# Patient Record
Sex: FEMALE | Race: BLACK OR AFRICAN AMERICAN | Employment: FULL TIME
[De-identification: names, ages, dates, MRNs, and addresses within clinical notes are randomized per-mention and may not be internally consistent; named-entity substitution may affect disease eponyms.]

---

## 2023-09-14 ENCOUNTER — APPOINTMENT (OUTPATIENT)
Facility: HOSPITAL | Age: 28
End: 2023-09-14
Payer: COMMERCIAL

## 2023-09-14 ENCOUNTER — HOSPITAL ENCOUNTER (EMERGENCY)
Facility: HOSPITAL | Age: 28
Discharge: HOME | End: 2023-09-14
Attending: STUDENT IN AN ORGANIZED HEALTH CARE EDUCATION/TRAINING PROGRAM
Payer: COMMERCIAL

## 2023-09-14 VITALS
HEIGHT: 68 IN | DIASTOLIC BLOOD PRESSURE: 81 MMHG | TEMPERATURE: 97.6 F | SYSTOLIC BLOOD PRESSURE: 137 MMHG | OXYGEN SATURATION: 100 % | WEIGHT: 245 LBS | HEART RATE: 100 BPM | RESPIRATION RATE: 17 BRPM | BODY MASS INDEX: 37.13 KG/M2

## 2023-09-14 DIAGNOSIS — S30.1XXA CONTUSION OF ABDOMINAL WALL, INITIAL ENCOUNTER: Primary | ICD-10-CM

## 2023-09-14 DIAGNOSIS — R51.9 NONINTRACTABLE HEADACHE, UNSPECIFIED CHRONICITY PATTERN, UNSPECIFIED HEADACHE TYPE: ICD-10-CM

## 2023-09-14 DIAGNOSIS — R19.7 DIARRHEA, UNSPECIFIED TYPE: ICD-10-CM

## 2023-09-14 DIAGNOSIS — M25.78 CERVICAL OSTEOPHYTE: ICD-10-CM

## 2023-09-14 LAB
ANION GAP SERPL CALC-SCNC: 6 MMOL/L (ref 3–18)
BASOPHILS # BLD: 0 K/UL (ref 0–0.1)
BASOPHILS NFR BLD: 1 % (ref 0–2)
BUN SERPL-MCNC: 17 MG/DL (ref 7–18)
BUN/CREAT SERPL: 20 (ref 12–20)
CALCIUM SERPL-MCNC: 9 MG/DL (ref 8.5–10.1)
CHLORIDE SERPL-SCNC: 108 MMOL/L (ref 100–111)
CO2 SERPL-SCNC: 24 MMOL/L (ref 21–32)
CREAT SERPL-MCNC: 0.84 MG/DL (ref 0.6–1.3)
DIFFERENTIAL METHOD BLD: ABNORMAL
EOSINOPHIL # BLD: 0.4 K/UL (ref 0–0.4)
EOSINOPHIL NFR BLD: 5 % (ref 0–5)
ERYTHROCYTE [DISTWIDTH] IN BLOOD BY AUTOMATED COUNT: 13.4 % (ref 11.6–14.5)
GLUCOSE SERPL-MCNC: 91 MG/DL (ref 74–99)
HCG SERPL QL: NEGATIVE
HCT VFR BLD AUTO: 36.4 % (ref 35–45)
HGB BLD-MCNC: 11.5 G/DL (ref 12–16)
IMM GRANULOCYTES # BLD AUTO: 0 K/UL (ref 0–0.04)
IMM GRANULOCYTES NFR BLD AUTO: 0 % (ref 0–0.5)
LYMPHOCYTES # BLD: 2.3 K/UL (ref 0.9–3.6)
LYMPHOCYTES NFR BLD: 30 % (ref 21–52)
MCH RBC QN AUTO: 26.9 PG (ref 24–34)
MCHC RBC AUTO-ENTMCNC: 31.6 G/DL (ref 31–37)
MCV RBC AUTO: 85 FL (ref 78–100)
MONOCYTES # BLD: 0.4 K/UL (ref 0.05–1.2)
MONOCYTES NFR BLD: 5 % (ref 3–10)
NEUTS SEG # BLD: 4.6 K/UL (ref 1.8–8)
NEUTS SEG NFR BLD: 60 % (ref 40–73)
NRBC # BLD: 0 K/UL (ref 0–0.01)
NRBC BLD-RTO: 0 PER 100 WBC
PLATELET # BLD AUTO: 261 K/UL (ref 135–420)
PMV BLD AUTO: 11.2 FL (ref 9.2–11.8)
POTASSIUM SERPL-SCNC: 3.4 MMOL/L (ref 3.5–5.5)
RBC # BLD AUTO: 4.28 M/UL (ref 4.2–5.3)
SODIUM SERPL-SCNC: 138 MMOL/L (ref 136–145)
WBC # BLD AUTO: 7.7 K/UL (ref 4.6–13.2)

## 2023-09-14 PROCEDURE — 99285 EMERGENCY DEPT VISIT HI MDM: CPT

## 2023-09-14 PROCEDURE — 74177 CT ABD & PELVIS W/CONTRAST: CPT

## 2023-09-14 PROCEDURE — 80048 BASIC METABOLIC PNL TOTAL CA: CPT

## 2023-09-14 PROCEDURE — 84703 CHORIONIC GONADOTROPIN ASSAY: CPT

## 2023-09-14 PROCEDURE — 72128 CT CHEST SPINE W/O DYE: CPT

## 2023-09-14 PROCEDURE — 70450 CT HEAD/BRAIN W/O DYE: CPT

## 2023-09-14 PROCEDURE — 85025 COMPLETE CBC W/AUTO DIFF WBC: CPT

## 2023-09-14 PROCEDURE — 6360000004 HC RX CONTRAST MEDICATION: Performed by: HEALTH CARE PROVIDER

## 2023-09-14 RX ADMIN — IOPAMIDOL 100 ML: 612 INJECTION, SOLUTION INTRAVENOUS at 22:37

## 2023-09-14 ASSESSMENT — ENCOUNTER SYMPTOMS
CHEST TIGHTNESS: 0
SHORTNESS OF BREATH: 0
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
## 2023-09-15 NOTE — ED NOTES
Patient was seen and treated by provider. Patient was discharged to home. IV is discontinued, if applicable. Patient was accompanied by family/ friend/ self. Discharge instructions were explained to patient. Questions were answered.         Harsh Reyes RN  09/14/23 0988
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
## 2024-06-18 ENCOUNTER — HOSPITAL ENCOUNTER (OUTPATIENT)
Facility: HOSPITAL | Age: 29
Setting detail: RECURRING SERIES
Discharge: HOME | End: 2024-06-21
Payer: COMMERCIAL

## 2024-06-18 PROCEDURE — 97530 THERAPEUTIC ACTIVITIES: CPT

## 2024-06-18 PROCEDURE — 97162 PT EVAL MOD COMPLEX 30 MIN: CPT

## 2024-06-18 NOTE — PROGRESS NOTES
home and community integration to address functional deficits and attain remaining goals.     [x]  See Plan of Care         PLAN  [x]  Upgrade activities as tolerated     [x]  Continue plan of care  []  Update interventions per flow sheet       []  Discharge due to:_  []  Other:_        Arnulfo Lara, PT 6/18/2024  8:16 AM

## 2024-06-18 NOTE — THERAPY EVALUATION
[facility]  [address] [place] [address]  [phone] fax    Plan of Care/ Statement of Necessity for Physical Therapy Services    Patient Name: Yuliet Nunn : 1995   Medical   Diagnosis: Rectocele [N81.6] Treatment Diagnosis: R39.89  Other signs and symptoms of genitourinary system, N39.42  INCONTINENCE WITHOUT SENSORY AWARENESS, and M62.89  OTHER SPECIFIED DISORDERS OF MUSCLE      Onset Date: 2023 Payor :  Payor: RAMÍREZ / Plan: [employer] RPN / Product Type: *No Product type* /    Referral Source: Kinsey Page PA Start of Care (SOC): 2024   Prior Hospitalization: See medical history Provider #: [id number]   Prior Level of Function: mod ind with ADLs, chronic pelvic pain/dyspareunia    Comorbidities: Chronic pelvic pain, multiple medical problems              The Plan of Care and following information is based on the information from the initial evaluation.  Assessment/ baxter information: Ms. Yuliet Nunn is a 30 y/o, F who present with c/o rectocele, PFD/dyspareunia, constipation, & mild UI. Problem started around Nov last year when pt had a lot of other medical problems at the same time.  Bowel frequency ranges from 1x/day to skipping 4-7 days in a row.  Pt reports significant straining, leaning her trunk backwards to help with BM but incomplete voiding most of the time.  Pt notes bleeding (about 1x/week) & aggravated hemorrhoid with BM. She's been increasing fiber to help with BM.  Pt reports very mild urinary leakage with some activities, less than 1x/week. She notes some trouble with starting urine stream & incomplete voiding sensation. Nocturia is 1x/night. She notes some blood with her vaginal discharge after last BM.  Pt reports chronic pelvic pain/discomfort due to scar tissue after Bartholin's cyst removal. Pain occurs with both initial penetration & deep penetration, rated 3/3 on Marinoff scale.  Pt experiences

## 2024-06-25 ENCOUNTER — HOSPITAL ENCOUNTER (OUTPATIENT)
Facility: HOSPITAL | Age: 29
Setting detail: RECURRING SERIES
Discharge: HOME | End: 2024-06-28
Payer: COMMERCIAL

## 2024-06-25 PROCEDURE — 97530 THERAPEUTIC ACTIVITIES: CPT

## 2024-06-25 PROCEDURE — 97112 NEUROMUSCULAR REEDUCATION: CPT

## 2024-06-25 NOTE — PROGRESS NOTES
proprioception to improve patient's ability to develop conscious control of individual muscles and awareness of position of extremities in order to progress to PLOF and address remaining functional goals. (see flow sheet as applicable)     Details if applicable:  PFM coordination, KNACK, relaxation techniques/tip     Rationale: Increase pelvic floor muscle strength, Improve quality of pelvic floor contractions, Decrease resting tone of the pelvic floor, Increase tissue extensibility of the pelvic floor muscles, Increase core strength, Inhibit abnormal muscle activity, and Improve lumbosacral and coccygeal mobility in order to Increase urinary continence, Decrease urinary urgency, Increase ability to delay urination, Decrease frequency of urination, Decrease nocturia, Improve frequency and ease of bowel movements, Improve ability to engage in sexual intercourse, Improve ability to undergo a gynecological exam, and Improve ability to perform ADLs.    Total  34 Total Reminder: MC/BC bill using total billable min of TIMED therapeutic procedures (example: do not include dry needle or estim unattended, both untimed codes, in totals to left)     [x]  Patient Education billed concurrently with other procedures     Other Objective/Functional Measures:   []baseline resting tone:   []slow twitch mms   []fast twitch mms    Pain Level (0-10 scale) post treatment: 3/10    ASSESSMENT/Changes in Function: pt present with mod-max tone of PFM, 4/10 pain with palpation; strength deemed 1+/5; fair endurance, max difficulty with complete relaxation of PFM after contraction. Pt demonstrates good understanding with pain management & pelvic wand use. Pt deemed highly benefit from TENS/NMES unit for pain relief. Will progress with down-training next visit. Will perform trial of NMES as tolerated.     []  Decrease # of leaks   [] No change []  Improving [] Resolved     []  Decrease hypertonus [] No change []  Improving [] Resolved     []

## 2024-07-02 ENCOUNTER — APPOINTMENT (OUTPATIENT)
Facility: HOSPITAL | Age: 29
End: 2024-07-02
Payer: COMMERCIAL

## 2024-07-09 ENCOUNTER — HOSPITAL ENCOUNTER (OUTPATIENT)
Facility: HOSPITAL | Age: 29
Setting detail: RECURRING SERIES
Discharge: HOME | End: 2024-07-12
Payer: COMMERCIAL

## 2024-07-09 PROCEDURE — 97530 THERAPEUTIC ACTIVITIES: CPT

## 2024-07-09 PROCEDURE — 97110 THERAPEUTIC EXERCISES: CPT

## 2024-07-16 ENCOUNTER — HOSPITAL ENCOUNTER (OUTPATIENT)
Facility: HOSPITAL | Age: 29
Setting detail: RECURRING SERIES
Discharge: HOME | End: 2024-07-19
Payer: COMMERCIAL

## 2024-07-16 PROCEDURE — 97032 APPL MODALITY 1+ESTIM EA 15: CPT

## 2024-07-16 PROCEDURE — 97530 THERAPEUTIC ACTIVITIES: CPT

## 2024-07-16 NOTE — PROGRESS NOTES
PF DAILY TREATMENT NOTE       Patient Name: Yuliet Nunn    Date: 2024    : 1995  Insurance: Payor: RAMÍREZ / Plan: RAM[employer]REZ RPN / Product Type: *No Product type* /      Patient  verified yes     Visit #   Current / Total 4 16-24   Time   In / Out 10:41 11:15   Pain   In / Out 0/10 0/10   Subjective Functional Status/Changes: Pt reports doing very well so far. Urinary leakage nearly resolves; she still notes some urgency & incomplete voiding problem . She's been using the tool to relieve her BM. Pt notes min pressure with sitting.      TREATMENT AREA =  Rectocele [N81.6]  Other specified disorders of muscle [M62.89]      OBJECTIVE    Modalities Rationale:     increase tissue extensibility and increase muscle contraction/control to improve patient's ability to progress to PLOF and address remaining functional goals.     min [] Estim Unattended, type/location:                                      []  w/ice    []  w/heat   10 min [x] Estim Attended, type/location: Internal NMES for PFM's tone/weakness; supine, wedge under knees                                    []  w/US     []  w/ice    []  w/heat    []  TENS insruct      min []  Mechanical Traction: type/lbs                   []  pro   []  sup   []  int   []  cont    []  before manual    []  after manual    min []  Ultrasound, settings/location:      min  unbill []  Ice     []  Heat    location/position:     min []  Paraffin,  details:     min []  Vasopneumatic Device, press/temp:     min []  Whirlpool / Fluido:    If using vaso (only need to measure limb vaso being performed on)      pre-treatment girth :       post-treatment girth :       measured at (landmark location) :      min []  Other:    Skin assessment post-treatment:   Intact      Therapeutic Procedures:    Tx Min Billable or 1:1 Min (if diff from Tx Min) Procedure, Rationale, Specifics       Patient Education:  [] positioning   [] body mechanics   [] transfers   [] heat/ice application

## 2024-07-16 NOTE — PROGRESS NOTES
[facility]  [address] [place] [address]  Ph [phone]  Fx [phone]    Pelvic Floor Progress Note  Patient Name: Yuliet Nunn : 1995   Medical   Diagnosis: Rectocele [N81.6] Treatment Diagnosis: R39.89  Other signs and symptoms of genitourinary system, N39.42  INCONTINENCE WITHOUT SENSORY AWARENESS, and M62.89  OTHER SPECIFIED DISORDERS OF MUSCLE      Onset Date: 2023 Payor :  Payor: RAMÍREZ / Plan: [employer] RPN / Product Type: *No Product type* /    Referral Source: Kinsey Page PA Start of Care (SOC): 2024   Prior Hospitalization: See medical history Provider #: [id number]   Prior Level of Function: mod ind with ADLs, chronic pelvic pain/dyspareunia    Comorbidities: Chronic pelvic pain, multiple medical problems                                              Visits from Start of Care: 4    Missed Visits: 0    Established Goals:             Excellent   Good           Limited             None  [x] Increase Pelvic MM strength        []      []  [x]  []  [x] Decrease Pelvic MM hypertonus      []      []  [x]  []  [x] Decrease Incontinence Episodes     []      [x]  []  []   [x] Improve Voiding Habits         []      []  [x]  []  [x] Decreased Urgency         []      []  [x]  []    Key Functional Changes: improving bladder leakage, improving pain, improving ease with voiding bowel & bladder, improving awareness of PFM    Current progress towards goals:  Short Term Goals: To be accomplished in 4-6 weeks:  1. Patient will demonstrate accurate performance of home exercise program/pelvic floor contractions as adjunct to physical therapy clinic visits to promote healthy lifestyle and improved quality of life.  Status at evaluation: given HEP  Current: good understanding with pelvic wand for PFM MRR & current HEP 24; good compliance 24     2. Patient will report at least 25% improvement of ease with complete emptying her bowel to

## 2024-07-23 ENCOUNTER — HOSPITAL ENCOUNTER (OUTPATIENT)
Facility: HOSPITAL | Age: 29
Setting detail: RECURRING SERIES
Discharge: HOME | End: 2024-07-26
Payer: COMMERCIAL

## 2024-07-23 PROCEDURE — 97112 NEUROMUSCULAR REEDUCATION: CPT

## 2024-07-23 PROCEDURE — 97530 THERAPEUTIC ACTIVITIES: CPT

## 2024-07-23 NOTE — PROGRESS NOTES
PF DAILY TREATMENT NOTE       Patient Name: Yuliet Nunn    Date: 2024    : 1995  Insurance: Payor: RAMÍREZ / Plan: RAMÍREZ RPN / Product Type: *No Product type* /      Patient  verified yes     Visit #   Current / Total 1 8-16   Time   In / Out 10:45 11:23   Pain   In / Out 0/10 0/10   Subjective Functional Status/Changes: Pt reports bowel is doing fairly the same.  No problem with bladder.     TREATMENT AREA =  Rectocele [N81.6]  Other specified disorders of muscle [M62.89]      OBJECTIVETherapeutic Procedures:     Tx Min Billable or 1:1 Min (if diff from Tx Min) Procedure, Rationale, Specifics       Patient Education:  [] positioning   [] body mechanics   [] transfers   [] heat/ice application      30  91598 Neuromuscular Re-Education (timed):  improve balance, coordination, kinesthetic sense, posture, core stability and proprioception to improve patient's ability to develop conscious control of individual muscles and awareness of position of extremities in order to progress to PLOF and address remaining functional goals. (see flow sheet as applicable)     Details if applicable:  Biofeedback     8   12767 Therapeutic Activity (timed):  use of dynamic activities replicating functional movements to increase ROM, strength, coordination, balance, and proprioception in order to improve patient's ability to progress to PLOF and address remaining functional goals.  (see flow sheet as applicable)      Details if applicable: bowel & bladder status     Rationale: Increase pelvic floor muscle strength, Improve quality of pelvic floor contractions, Decrease resting tone of the pelvic floor, Increase tissue extensibility of the pelvic floor muscles, Increase core strength, Inhibit abnormal muscle activity, and Improve lumbosacral and coccygeal mobility in order to Increase urinary continence, Decrease urinary urgency, Increase ability to delay urination, Decrease frequency of urination, Decrease nocturia,

## 2024-07-30 ENCOUNTER — APPOINTMENT (OUTPATIENT)
Facility: HOSPITAL | Age: 29
End: 2024-07-30
Payer: COMMERCIAL

## 2024-08-06 ENCOUNTER — APPOINTMENT (OUTPATIENT)
Facility: HOSPITAL | Age: 29
End: 2024-08-06
Payer: COMMERCIAL

## 2024-08-20 ENCOUNTER — APPOINTMENT (OUTPATIENT)
Facility: HOSPITAL | Age: 29
End: 2024-08-20
Payer: COMMERCIAL

## 2024-09-09 ENCOUNTER — HOSPITAL ENCOUNTER (OUTPATIENT)
Facility: HOSPITAL | Age: 29
Setting detail: RECURRING SERIES
Discharge: HOME | End: 2024-09-12
Payer: COMMERCIAL

## 2024-09-09 PROCEDURE — 97112 NEUROMUSCULAR REEDUCATION: CPT

## 2024-09-09 PROCEDURE — 97110 THERAPEUTIC EXERCISES: CPT

## 2024-09-09 PROCEDURE — 97535 SELF CARE MNGMENT TRAINING: CPT

## 2024-09-09 PROCEDURE — 97530 THERAPEUTIC ACTIVITIES: CPT

## 2024-10-11 ENCOUNTER — HOSPITAL ENCOUNTER (OUTPATIENT)
Facility: HOSPITAL | Age: 29
Setting detail: RECURRING SERIES
Discharge: HOME | End: 2024-10-14
Payer: COMMERCIAL

## 2024-10-11 PROCEDURE — 97530 THERAPEUTIC ACTIVITIES: CPT

## 2024-10-11 PROCEDURE — 97535 SELF CARE MNGMENT TRAINING: CPT

## 2024-10-11 PROCEDURE — 97112 NEUROMUSCULAR REEDUCATION: CPT

## 2024-10-11 NOTE — PROGRESS NOTES
Patient will continue to benefit from skilled PT / OT services to modify and progress therapeutic interventions, analyze and address functional mobility deficits, analyze and address ROM deficits, analyze and address strength deficits, analyze and address soft tissue restrictions, analyze and cue for proper movement patterns, analyze and modify for postural abnormalities, analyze and address imbalance/dizziness, and instruct in home and community integration to address functional deficits and attain remaining goals.       Non-Medicare, can change goals, can adjust or add frequency duration, no signature required      New Goals to be achieved in __2-4__ WEEKS  1. Pt will be independent with HEP/self management to promote pain free & safe ADLs, job duties performance & improve overall QOL.  Status at Progress Note: good progression & compliance with all activities 10-11-24      Frequency / Duration:   Patient to be seen   1-2   times per week for   4    WEEKS    RECOMMENDATIONS  Patient would benefit from the continuation of skilled rehab interventions for functional progress to achieving above stated clinically significant goals.  Continue per initial Plan of Care.    If you have any questions/comments please contact us directly.  Thank you for allowing us to assist in the care of your patient.    Arnulfo Lara, PT       10/11/2024       7:37 AM    
analyze and modify for postural abnormalities, analyze and address imbalance/dizziness, and instruct in home and community integration to address functional deficits and attain remaining goals.     []  See Plan of Care  [x]  See progress note/recertification  []  See Discharge Summary         Progress towards goals / Updated goals:  Short Term Goals: To be accomplished in Goals to be achieved in __4__ WEEKS  1. Patient will demonstrate independence in HEP for maintenance of pelvic floor program and improved quality of life.  Status at progress Note: partial 9-9-24  Current: met 10-11-24    2. Patient will report at least 50% improvement of ease with complete emptying her bowel to improve her QOL.  Status at progress Note: 30% progression 9-9-24  Current: met 10-11-24     3. Patient will report at least 50% improvement of pain/pressure/prolapse symptoms to improve her QOL.  Status at progress Note: just around menstrual cycles, 2/10 9-9-24  Current: progressing well, at least 50% improvement, pressure occurs mostly during the menstrual cycles 10-11-24     4. Patient will have increased pelvic floor muscle motor performance by 1/2 to 1 grade for improved urinary continence and quality of life.  Status at progress Note: progressing gradually, improving ease with urine stop test 9-9-24  Current: progressing gradually, improving ease with HEP/PFM exercises 10-11-24    5. Patient will improve PFDI-20 score at least 50% of her current points indicating improvement in function for icreased quality of life.  Status at progress Note: not met, regressed significantly, 140.64% 9-9-24  Current: met, improved 85% 10-11-24     6. Patient will improve dyspareunia symptoms/pain rating to 2/3 on the Marinoff scale to imrpvoe her QOL.  Status at progress Note: 3/3; not active due to medical problem 9-9-24  Current: met 10-11-24     Next PN due 11-9-2024  Auth due (visit number/ date) after 1st 8 visits     PLAN  [x]  Upgrade